# Patient Record
Sex: FEMALE | Race: NATIVE HAWAIIAN OR OTHER PACIFIC ISLANDER
[De-identification: names, ages, dates, MRNs, and addresses within clinical notes are randomized per-mention and may not be internally consistent; named-entity substitution may affect disease eponyms.]

---
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
## 2024-12-30 ENCOUNTER — HOSPITAL ENCOUNTER (INPATIENT)
Dept: HOSPITAL 47 - FBPOP | Age: 19
LOS: 1 days | Discharge: HOME | DRG: 560 | End: 2024-12-31
Attending: OBSTETRICS & GYNECOLOGY | Admitting: OBSTETRICS & GYNECOLOGY
Payer: COMMERCIAL

## 2024-12-30 VITALS — RESPIRATION RATE: 16 BRPM

## 2024-12-30 DIAGNOSIS — Z79.82: ICD-10-CM

## 2024-12-30 DIAGNOSIS — Z3A.40: ICD-10-CM

## 2024-12-30 LAB
BASOPHILS # BLD AUTO: 0 K/UL (ref 0–0.2)
BASOPHILS NFR BLD AUTO: 0 %
EOSINOPHIL # BLD AUTO: 0.1 K/UL (ref 0–0.7)
EOSINOPHIL NFR BLD AUTO: 1 %
ERYTHROCYTE [DISTWIDTH] IN BLOOD BY AUTOMATED COUNT: 3.88 M/UL (ref 3.8–5.4)
ERYTHROCYTE [DISTWIDTH] IN BLOOD: 12.6 % (ref 11.5–15.5)
HCT VFR BLD AUTO: 35.4 % (ref 34–46)
HGB BLD-MCNC: 11.9 GM/DL (ref 11.4–16)
LYMPHOCYTES # SPEC AUTO: 2.4 K/UL (ref 1–4.8)
LYMPHOCYTES NFR SPEC AUTO: 18 %
MCH RBC QN AUTO: 30.7 PG (ref 25–35)
MCHC RBC AUTO-ENTMCNC: 33.6 G/DL (ref 31–37)
MCV RBC AUTO: 91.4 FL (ref 80–100)
MONOCYTES # BLD AUTO: 0.6 K/UL (ref 0–1)
MONOCYTES NFR BLD AUTO: 4 %
NEUTROPHILS # BLD AUTO: 10.4 K/UL (ref 1.3–7.7)
NEUTROPHILS NFR BLD AUTO: 76 %
PLATELET # BLD AUTO: 509 K/UL (ref 150–450)
WBC # BLD AUTO: 13.7 K/UL (ref 3.8–10.6)

## 2024-12-30 PROCEDURE — 86900 BLOOD TYPING SEROLOGIC ABO: CPT

## 2024-12-30 PROCEDURE — 85025 COMPLETE CBC W/AUTO DIFF WBC: CPT

## 2024-12-30 PROCEDURE — 86850 RBC ANTIBODY SCREEN: CPT

## 2024-12-30 PROCEDURE — 10907ZC DRAINAGE OF AMNIOTIC FLUID, THERAPEUTIC FROM PRODUCTS OF CONCEPTION, VIA NATURAL OR ARTIFICIAL OPENING: ICD-10-PCS

## 2024-12-30 PROCEDURE — 86901 BLOOD TYPING SEROLOGIC RH(D): CPT

## 2024-12-30 RX ADMIN — OXYTOCIN-SODIUM CHLORIDE 0.9% IV SOLN 30 UNIT/500ML SCH MLS/HR: 30-0.9/5 SOLUTION at 13:14

## 2024-12-30 RX ADMIN — DOCUSATE SODIUM AND SENNOSIDES SCH: 50; 8.6 TABLET ORAL at 21:54

## 2024-12-30 RX ADMIN — POTASSIUM CHLORIDE SCH MLS/HR: 14.9 INJECTION, SOLUTION INTRAVENOUS at 10:16

## 2024-12-30 NOTE — P.HPOB
History of Present Illness


H&P Date: 24


Chief Complaint: 40 and 0/7 weeks, active labor





The patient is a 19-year-old  1 para 0 admitted at 40-0/7 weeks as 

established by 11-week ultrasound.  She is admitted in active labor with all 

signs reassuring, category 1 fetal heart rate tracing.  Her pregnancy has been 

uncomplicated aside from mild anemia for which she has been taking iron.  Group 

B strep status is negative.





Obstetrical history:  1 para 0 with current pregnancy statistics listed 

in history of present illness.  EDC of 2024 was established by 11-week 

ultrasound.  Laboratory workup demonstrates a blood type of O+ with a negative 

antibody screen.  Rubella status is immune.  The remainder of the laboratory 

workup was within normal limits.  Early Glucola as well as second trimester 

Glucola were normal and group B strep status is negative.





Gynecologic history: Unremarkable with no history of any infections to include 

STDs.





Review of Systems





Review of systems is confined to history of present illness.





Past Medical History


Past Medical History: No Reported History


History of Any Multi-Drug Resistant Organisms: None Reported


Past Surgical History: No Surgical Hx Reported


Past Psychological History: ADD/ADHD


Smoking Status: Never smoker


Past Alcohol Use History: None Reported


Past Drug Use History: Unable to Obtain





Medications and Allergies


                                Home Medications











 Medication  Instructions  Recorded  Confirmed  Type


 


Acetaminophen Tab [Tylenol] 650 mg PO Q6H PRN 21 History


 


Aspirin 1 tab PO DAILY 24 History


 


Ferrous Sulfate [Iron] 1 tab PO DAILY 24 History


 


Prenatal Vit No.179/Iron/Folic 1 tab PO DAILY 24 History





[Prenatal Tablet]    








                                    Allergies











Allergy/AdvReac Type Severity Reaction Status Date / Time


 


No Known Allergies Allergy   Verified 21 18:18














Exam


                                   Vital Signs











  Temp Pulse Resp BP Pulse Ox


 


 24 08:17  97.1 F L  72  16  118/80  99








                                Intake and Output











 24





 22:59 06:59 14:59


 


Other:   


 


  Weight   90.718 kg














General, this is a well-developed, well-nourished white female in no acute 

distress though she is uncomfortable in labor.  Her heart has a regular rhythm 

and rate without murmur.  Her lungs clear to auscultation bilaterally in all 

fields.  Her abdomen is gravid, nondistended, has normal active bowel sounds, 

soft, nontender, and without any palpable masses aside from uterine fundus.  Her

extremities are without any cyanosis, clubbing, or edema and are nontender to 

palpation bilaterally.  Digital cervical examination performed by the nursing 

staff demonstrates her cervix to be 6 to 7 cm dilated 70% effaced, with the 

vertex and presentation at -2 station.  The patient has at this time declined 

artificial rupture of membranes.





Assessment and Plan


(1) Active labor at term


Current Visit: Yes   Status: Acute   Code(s): KCY1527 -    SNOMED Code(s): 

64002820


   


Plan: 





The patient is admitted for active management of labor.  She will have close 

maternal and fetal surveillance and expectant management will be practiced.  She

is a good candidate for either IV or epidural analgesia should she so choose 

about at this time is choosing to use nitrous oxide for pain control.

## 2024-12-30 NOTE — P.PROBDLV
Vaginal Delivery Note





- .


Vaginal Delivery Note: 





Is a 19-year-old  1 para 0 admitted at 40-0/7 weeks by good dating 

parameters.  She is admitted in active labor with all signs reassuring, category

1 fetal heart rate tracing.  On labor and delivery, she was making slow progress

and ultimately had an epidural catheter placed for analgesia.  After she was 

comfortable, she underwent artificial rupture of membranes for possible meconium

stained fluid which was confirmed at the time of delivery.  She ultimately r

equired Pitocin augmentation but then progressed quickly to complete.  She labor

down for approximately 1/2-hour and then pushed over the course of approximately

30 minutes to a normal spontaneous vaginal delivery of a viable infant baby boy,

weight pending, with Apgars of 9 at 1 minute and 9 at 5 minutes delivered in the

direct occiput anterior position.  The placenta was delivered spontaneously, 

intact, grossly normal with a grossly normal three-vessel cord inserted 

approximately 4 cm from the margin of the placental disc.  There was some 

meconium staining.  There were no lacerations the perineum, vagina, or cervix.  

Estimated blood loss was approximate 150 mL.  There were no complications.  All 

sponge, instrument, and needle counts were correct.  Both mother and infant are 

resting comfortably in recovery.

## 2024-12-31 VITALS — TEMPERATURE: 98.2 F | SYSTOLIC BLOOD PRESSURE: 124 MMHG | DIASTOLIC BLOOD PRESSURE: 73 MMHG | HEART RATE: 76 BPM

## 2024-12-31 LAB
BASOPHILS # BLD AUTO: 0 K/UL (ref 0–0.2)
BASOPHILS NFR BLD AUTO: 0 %
EOSINOPHIL # BLD AUTO: 0.1 K/UL (ref 0–0.7)
EOSINOPHIL NFR BLD AUTO: 1 %
ERYTHROCYTE [DISTWIDTH] IN BLOOD BY AUTOMATED COUNT: 3.43 M/UL (ref 3.8–5.4)
ERYTHROCYTE [DISTWIDTH] IN BLOOD: 12.7 % (ref 11.5–15.5)
HCT VFR BLD AUTO: 31.5 % (ref 34–46)
HGB BLD-MCNC: 10.5 GM/DL (ref 11.4–16)
LYMPHOCYTES # SPEC AUTO: 3.1 K/UL (ref 1–4.8)
LYMPHOCYTES NFR SPEC AUTO: 22 %
MCH RBC QN AUTO: 30.7 PG (ref 25–35)
MCHC RBC AUTO-ENTMCNC: 33.5 G/DL (ref 31–37)
MCV RBC AUTO: 91.8 FL (ref 80–100)
MONOCYTES # BLD AUTO: 0.6 K/UL (ref 0–1)
MONOCYTES NFR BLD AUTO: 4 %
NEUTROPHILS # BLD AUTO: 10.4 K/UL (ref 1.3–7.7)
NEUTROPHILS NFR BLD AUTO: 72 %
PLATELET # BLD AUTO: 405 K/UL (ref 150–450)
WBC # BLD AUTO: 14.4 K/UL (ref 3.8–10.6)

## 2024-12-31 NOTE — P.DS
Providers


Date of admission: 


24 08:15





Expected date of discharge: 24


Attending physician: 


Ester Sanabria





Primary care physician: 


Stated None








- Discharge Diagnosis(es)


(1) Active labor at term


Current Visit: Yes   Status: Acute   





(2) Normal spontaneous vaginal delivery


Current Visit: Yes   Status: Acute   


Hospital Course: 





The patient is a 19-year-old  1 para 0 admitted at 40-0/7 weeks by good 

dating parameters.  She is admitted in active labor with all signs reassuring, 

category 1 fetal heart rate tracing.  Her pregnancy was uncomplicated and group 

B strep status is negative.  On labor and delivery, she ultimately had an 

epidural catheter placed for analgesia and then required Pitocin augmentation.  

She progressed to complete and then pushed fairly quickly to a normal 

spontaneous vaginal delivery of a viable 7 pound 11 ounce baby boy with Apgars 

of 9 at 1 minute and 9 at 5 minutes.  Her postpartum course was unremarkable 

with vital signs remaining stable and her temperature was afebrile throughout.  

She was deemed stable for discharge on postpartum day #1 and was discharged home

to follow-up in the office in 6 weeks time routinely.  Discharge instructions 

included calling for any significantly increased bleeding or foul-smelling 

lochia, significantly increased fever or abdominal pain, perineal complaints, 

breast complaints, or anything else that concerned her.  She was additionally 

instructed to have nothing in the vagina for at least 6 weeks time to include 

intercourse.  She understood her instructions and agrees to follow-up as noted 

above.  Discharge medications included continued prenatal vitamins as she has 

opted to breast-feed.  She was otherwise to use over-the-counter analgesic pain 

medications as needed.  Maternal blood type is O+ and rubella status is immune.


Procedures: 





#1.  Nitrous analgesia #2.  Epidural analgesia #3.  Artificial rupture of 

membranes #4.  Pitocin augmentation #5.  Normal spontaneous vaginal delivery


Patient Condition at Discharge: Stable





Plan - Discharge Summary


New Discharge Prescriptions: 


No Action


   Acetaminophen Tab [Tylenol] 650 mg PO Q6H PRN


     PRN Reason: Pain


   Aspirin 1 tab PO DAILY


   Prenatal Vit No.179/Iron/Folic [Prenatal Tablet] 1 tab PO DAILY


   Ferrous Sulfate [Iron] 1 tab PO DAILY


Discharge Medication List





Acetaminophen Tab [Tylenol] 650 mg PO Q6H PRN 21 [History]


Aspirin 1 tab PO DAILY 24 [History]


Ferrous Sulfate [Iron] 1 tab PO DAILY 24 [History]


Prenatal Vit No.179/Iron/Folic [Prenatal Tablet] 1 tab PO DAILY 24 

[History]








Follow up Appointment(s)/Referral(s): 


Ester Sanabria DO [Doctor of Osteopathic Medicine] - 6 Weeks


Discharge Disposition: HOME SELF-CARE